# Patient Record
Sex: FEMALE | Race: WHITE | HISPANIC OR LATINO | Employment: FULL TIME | URBAN - METROPOLITAN AREA
[De-identification: names, ages, dates, MRNs, and addresses within clinical notes are randomized per-mention and may not be internally consistent; named-entity substitution may affect disease eponyms.]

---

## 2024-07-15 ENCOUNTER — TELEPHONE (OUTPATIENT)
Dept: OBSTETRICS AND GYNECOLOGY | Facility: CLINIC | Age: 29
End: 2024-07-15
Payer: COMMERCIAL

## 2024-07-24 ENCOUNTER — APPOINTMENT (OUTPATIENT)
Dept: ULTRASOUND IMAGING | Facility: HOSPITAL | Age: 29
End: 2024-07-24
Payer: COMMERCIAL

## 2024-07-24 ENCOUNTER — HOSPITAL ENCOUNTER (EMERGENCY)
Facility: HOSPITAL | Age: 29
Discharge: HOME | End: 2024-07-24
Attending: EMERGENCY MEDICINE
Payer: COMMERCIAL

## 2024-07-24 ENCOUNTER — TELEPHONE (OUTPATIENT)
Dept: OBSTETRICS AND GYNECOLOGY | Facility: CLINIC | Age: 29
End: 2024-07-24
Payer: COMMERCIAL

## 2024-07-24 VITALS
BODY MASS INDEX: 34.55 KG/M2 | DIASTOLIC BLOOD PRESSURE: 65 MMHG | SYSTOLIC BLOOD PRESSURE: 128 MMHG | OXYGEN SATURATION: 100 % | TEMPERATURE: 98 F | WEIGHT: 195 LBS | HEIGHT: 63 IN | RESPIRATION RATE: 14 BRPM | HEART RATE: 49 BPM

## 2024-07-24 DIAGNOSIS — O41.8X10 SUBCHORIONIC HEMATOMA IN FIRST TRIMESTER, SINGLE OR UNSPECIFIED FETUS: ICD-10-CM

## 2024-07-24 DIAGNOSIS — O46.8X1 SUBCHORIONIC HEMATOMA IN FIRST TRIMESTER, SINGLE OR UNSPECIFIED FETUS: ICD-10-CM

## 2024-07-24 DIAGNOSIS — O20.0 THREATENED ABORTION: ICD-10-CM

## 2024-07-24 DIAGNOSIS — Z3A.01 5 WEEKS GESTATION OF PREGNANCY: Primary | ICD-10-CM

## 2024-07-24 LAB
ABO GROUP BLD: NORMAL
ALBUMIN SERPL-MCNC: 4.1 G/DL (ref 3.5–5.2)
ALBUMIN/GLOB SERPL: 1.4 G/DL
ALP SERPL-CCNC: 53 U/L (ref 39–117)
ALT SERPL W P-5'-P-CCNC: 13 U/L (ref 1–33)
ANION GAP SERPL CALCULATED.3IONS-SCNC: 11 MMOL/L (ref 5–15)
AST SERPL-CCNC: 17 U/L (ref 1–32)
BACTERIA UR QL AUTO: ABNORMAL /HPF
BASOPHILS # BLD AUTO: 0.03 10*3/MM3 (ref 0–0.2)
BASOPHILS NFR BLD AUTO: 0.4 % (ref 0–1.5)
BILIRUB SERPL-MCNC: 0.3 MG/DL (ref 0–1.2)
BILIRUB UR QL STRIP: NEGATIVE
BUN SERPL-MCNC: 13 MG/DL (ref 6–20)
BUN/CREAT SERPL: 20.3 (ref 7–25)
CALCIUM SPEC-SCNC: 9.3 MG/DL (ref 8.6–10.5)
CHLORIDE SERPL-SCNC: 101 MMOL/L (ref 98–107)
CLARITY UR: ABNORMAL
CO2 SERPL-SCNC: 22 MMOL/L (ref 22–29)
COLOR UR: YELLOW
CREAT SERPL-MCNC: 0.64 MG/DL (ref 0.57–1)
DEPRECATED RDW RBC AUTO: 46.3 FL (ref 37–54)
EGFRCR SERPLBLD CKD-EPI 2021: 124.4 ML/MIN/1.73
EOSINOPHIL # BLD AUTO: 0.03 10*3/MM3 (ref 0–0.4)
EOSINOPHIL NFR BLD AUTO: 0.4 % (ref 0.3–6.2)
ERYTHROCYTE [DISTWIDTH] IN BLOOD BY AUTOMATED COUNT: 13.2 % (ref 12.3–15.4)
GLOBULIN UR ELPH-MCNC: 3 GM/DL
GLUCOSE SERPL-MCNC: 94 MG/DL (ref 65–99)
GLUCOSE UR STRIP-MCNC: NEGATIVE MG/DL
HCG INTACT+B SERPL-ACNC: NORMAL MIU/ML
HCT VFR BLD AUTO: 41.4 % (ref 34–46.6)
HGB BLD-MCNC: 13.7 G/DL (ref 12–15.9)
HGB UR QL STRIP.AUTO: ABNORMAL
HYALINE CASTS UR QL AUTO: ABNORMAL /LPF
IMM GRANULOCYTES # BLD AUTO: 0.02 10*3/MM3 (ref 0–0.05)
IMM GRANULOCYTES NFR BLD AUTO: 0.3 % (ref 0–0.5)
KETONES UR QL STRIP: ABNORMAL
LEUKOCYTE ESTERASE UR QL STRIP.AUTO: NEGATIVE
LYMPHOCYTES # BLD AUTO: 1.91 10*3/MM3 (ref 0.7–3.1)
LYMPHOCYTES NFR BLD AUTO: 28.1 % (ref 19.6–45.3)
MCH RBC QN AUTO: 31.4 PG (ref 26.6–33)
MCHC RBC AUTO-ENTMCNC: 33.1 G/DL (ref 31.5–35.7)
MCV RBC AUTO: 95 FL (ref 79–97)
MONOCYTES # BLD AUTO: 0.5 10*3/MM3 (ref 0.1–0.9)
MONOCYTES NFR BLD AUTO: 7.4 % (ref 5–12)
NEUTROPHILS NFR BLD AUTO: 4.31 10*3/MM3 (ref 1.7–7)
NEUTROPHILS NFR BLD AUTO: 63.4 % (ref 42.7–76)
NITRITE UR QL STRIP: NEGATIVE
NRBC BLD AUTO-RTO: 0 /100 WBC (ref 0–0.2)
NUMBER OF DOSES: NORMAL
PH UR STRIP.AUTO: 7 [PH] (ref 5–8)
PLATELET # BLD AUTO: 274 10*3/MM3 (ref 140–450)
PMV BLD AUTO: 10.3 FL (ref 6–12)
POTASSIUM SERPL-SCNC: 4.2 MMOL/L (ref 3.5–5.2)
PROT SERPL-MCNC: 7.1 G/DL (ref 6–8.5)
PROT UR QL STRIP: ABNORMAL
RBC # BLD AUTO: 4.36 10*6/MM3 (ref 3.77–5.28)
RBC # UR STRIP: ABNORMAL /HPF
REF LAB TEST METHOD: ABNORMAL
RH BLD: POSITIVE
SODIUM SERPL-SCNC: 134 MMOL/L (ref 136–145)
SP GR UR STRIP: 1.02 (ref 1–1.03)
SQUAMOUS #/AREA URNS HPF: ABNORMAL /HPF
UROBILINOGEN UR QL STRIP: ABNORMAL
WBC # UR STRIP: ABNORMAL /HPF
WBC NRBC COR # BLD AUTO: 6.8 10*3/MM3 (ref 3.4–10.8)

## 2024-07-24 PROCEDURE — 80053 COMPREHEN METABOLIC PANEL: CPT | Performed by: PHYSICIAN ASSISTANT

## 2024-07-24 PROCEDURE — 86901 BLOOD TYPING SEROLOGIC RH(D): CPT | Performed by: PHYSICIAN ASSISTANT

## 2024-07-24 PROCEDURE — 81001 URINALYSIS AUTO W/SCOPE: CPT | Performed by: PHYSICIAN ASSISTANT

## 2024-07-24 PROCEDURE — 86900 BLOOD TYPING SEROLOGIC ABO: CPT | Performed by: PHYSICIAN ASSISTANT

## 2024-07-24 PROCEDURE — 84702 CHORIONIC GONADOTROPIN TEST: CPT | Performed by: PHYSICIAN ASSISTANT

## 2024-07-24 PROCEDURE — 76817 TRANSVAGINAL US OBSTETRIC: CPT

## 2024-07-24 PROCEDURE — 93976 VASCULAR STUDY: CPT

## 2024-07-24 PROCEDURE — 85025 COMPLETE CBC W/AUTO DIFF WBC: CPT | Performed by: PHYSICIAN ASSISTANT

## 2024-07-24 PROCEDURE — 36415 COLL VENOUS BLD VENIPUNCTURE: CPT

## 2024-07-24 PROCEDURE — 99284 EMERGENCY DEPT VISIT MOD MDM: CPT

## 2024-07-24 NOTE — LETTER
July 24, 2024     Patient: Yasmeen Nieto   YOB: 1995   Date of Visit: 7/24/2024       To Whom It May Concern:    It is my medical opinion that Yasmeen Nieto may return to light duty immediately with the following restrictions: No heavy or repetitive lifting greater than 20 pounds for the next two weeks . This may be re-evaluated after that time.           Sincerely,        Vanessa Bowles MD    CC: No Recipients

## 2024-07-24 NOTE — ED PROVIDER NOTES
Subjective   History of Present Illness  28-year-old female presents emergency department today with some vaginal bleeding.  She states she thinks she is about 7 to 8 weeks pregnant.  She been having some bleeding yesterday since then clots about the size of a dime or so.  Not passed any tissue.  She is G1, P0 Ab0.  She has a appointment with her OB/GYN for the first time in August.  She had no dysuria frequency urgency or hematuria.  She has no other complaints.    History provided by:  Patient and relative   used: No    Vaginal Bleeding - Pregnant  Quality:  Bright red and clots  Severity:  Moderate  Onset quality:  Sudden  Duration:  1 day  Timing:  Intermittent  Progression:  Waxing and waning  Chronicity:  New  Prior pregnancy: no    Pregnancy confirmed by ultrasound: no    Gestational age:  7  Prenatal care:  No prenatal care  Number of pads used:  2  Context: not after bowel movement, not after intercourse, not at rest, not during bowel movement, not during intercourse, not during urination, not genital trauma and not spontaneously    Relieved by:  Nothing  Worsened by:  Nothing  Ineffective treatments:  None tried  Associated symptoms: no abdominal pain, no back pain, no dizziness, no dyspareunia, no dysuria, no fatigue, no fever, no nausea and no vaginal discharge    Risk factors: no bleeding disorder, no hx of ectopic pregnancy, no hx of endometriosis, does not have multiple partners, no ovarian cysts, no ovarian torsion, no PID, no prior miscarriage, no STD exposure, no terminated pregnancy and no unprotected sex        Review of Systems   Constitutional:  Negative for fatigue and fever.   Respiratory:  Negative for chest tightness, shortness of breath and wheezing.    Cardiovascular:  Negative for chest pain and palpitations.   Gastrointestinal:  Negative for abdominal pain and nausea.   Genitourinary:  Negative for dyspareunia, dysuria, flank pain, frequency, hematuria and vaginal  discharge.   Musculoskeletal:  Negative for back pain.   Neurological:  Negative for dizziness.   Hematological: Negative.    Psychiatric/Behavioral: Negative.         No past medical history on file.    No Known Allergies    No past surgical history on file.    No family history on file.    Social History     Socioeconomic History    Marital status: Single           Objective   Physical Exam  Vitals and nursing note reviewed.   Constitutional:       General: She is not in acute distress.     Appearance: She is well-developed. She is not diaphoretic.   HENT:      Head: Normocephalic and atraumatic.      Nose: Nose normal.   Eyes:      General: No scleral icterus.     Conjunctiva/sclera: Conjunctivae normal.   Cardiovascular:      Rate and Rhythm: Normal rate and regular rhythm.      Heart sounds: Normal heart sounds. No murmur heard.  Pulmonary:      Effort: Pulmonary effort is normal. No respiratory distress.      Breath sounds: Normal breath sounds.   Abdominal:      General: Bowel sounds are normal.      Palpations: Abdomen is soft.      Tenderness: There is no abdominal tenderness.   Musculoskeletal:         General: Normal range of motion.      Cervical back: Normal range of motion and neck supple.   Skin:     General: Skin is warm and dry.   Neurological:      Mental Status: She is alert and oriented to person, place, and time.   Psychiatric:         Behavior: Behavior normal.         Procedures           ED Course  ED Course as of 07/24/24 1053   Wed Jul 24, 2024   1051 Laboratory values white count of 6.8 with an H&H of 13 and 41 and a platelet count of 274 urinalysis was contaminated had TNTC RBCs 0-2 WBCs.  She is a positive blood type.  No RhoGAM needed.  Her quantitative hCG was 19,000.  CMP had glucose of 94 and sodium 94 BUN of 13 creatinine 0.64. [YAS]      ED Course User Index  [YAS] Bubba Garcia PA                                             Medical Decision Making  Problems Addressed:  5  weeks gestation of pregnancy: complicated acute illness or injury  Subchorionic hematoma in first trimester, single or unspecified fetus: complicated acute illness or injury  Threatened : complicated acute illness or injury    Amount and/or Complexity of Data Reviewed  Labs: ordered.  Radiology: ordered.        Final diagnoses:   5 weeks gestation of pregnancy   Subchorionic hematoma in first trimester, single or unspecified fetus   Threatened        ED Disposition  ED Disposition       ED Disposition   Discharge    Condition   Stable    Comment   --               Vanessa Bowles MD  [address] [address]  [phone]      Call for appointment         Medication List      No changes were made to your prescriptions during this visit.            Bubba Garcia PA  24 [id number]

## 2024-07-24 NOTE — Clinical Note
[facility] EMERGENCY DEPARTMENT  [address]  [facility] [address]  Phone: [phone]    Yasmeen Nieto was seen and treated in our emergency department on 7/24/2024.  She may return to work on 07/25/2024.  Pelvic rest, return for bleeding greater than 2 pads an hour for 2 or more consecutive hours.       Thank you for choosing [facility].    Bubba Garcia PA

## 2024-07-24 NOTE — TELEPHONE ENCOUNTER
Provider:  DR FALCON    Caller:MARIIA JEFERSON    Phone Number:[phone]     Reason for Call:PATIENT WAS SEEN AT THE ER TODAY FOR THREATEN MISCARRIAGE AND THEY ADVISED TO FOLLOW UP WITH HER OB AND THEY ER PUT HER ON PELVIC REST SHE ISNT SURE WHAT THAT MEANS SHE WORKS AT [employer] AND STANDS FOR LONG HOURS (10HR SHIFTS) AND HEAVY LIFTING//SHE IS SUPPOSED TO RETURN TOMORROW PER THE NOTE//PLEASE FOLLOW UP

## 2024-07-24 NOTE — TELEPHONE ENCOUNTER
Pt instructed on light duty/pelvic rest restrictions.   Return to work on light duty letter created and left at  for pt to .  Appt rescheduled to 7/30/24 with US to FU TAB.

## 2024-07-24 NOTE — TELEPHONE ENCOUNTER
PT HAD PAPERWORK FROM HER JOB FAXED TO OFFICE WHICH NEEDS TO BE FILLED OUT & FAXED BACK BY TOMORROW    ONCE IT DONE, PLEASE ATTACH A COPY TO HER MYCHART

## 2024-07-27 ENCOUNTER — HOSPITAL ENCOUNTER (EMERGENCY)
Facility: HOSPITAL | Age: 29
Discharge: HOME | End: 2024-07-27
Attending: EMERGENCY MEDICINE
Payer: COMMERCIAL

## 2024-07-27 ENCOUNTER — APPOINTMENT (OUTPATIENT)
Dept: ULTRASOUND IMAGING | Facility: HOSPITAL | Age: 29
End: 2024-07-27
Payer: COMMERCIAL

## 2024-07-27 VITALS
HEART RATE: 50 BPM | DIASTOLIC BLOOD PRESSURE: 75 MMHG | RESPIRATION RATE: 18 BRPM | BODY MASS INDEX: 34.55 KG/M2 | SYSTOLIC BLOOD PRESSURE: 123 MMHG | HEIGHT: 63 IN | TEMPERATURE: 98.8 F | WEIGHT: 195 LBS | OXYGEN SATURATION: 99 %

## 2024-07-27 DIAGNOSIS — O03.9 MISCARRIAGE: Primary | ICD-10-CM

## 2024-07-27 LAB
ABO GROUP BLD: NORMAL
BASOPHILS # BLD AUTO: 0.04 10*3/MM3 (ref 0–0.2)
BASOPHILS NFR BLD AUTO: 0.4 % (ref 0–1.5)
BLD GP AB SCN SERPL QL: NEGATIVE
DEPRECATED RDW RBC AUTO: 47 FL (ref 37–54)
EOSINOPHIL # BLD AUTO: 0.01 10*3/MM3 (ref 0–0.4)
EOSINOPHIL NFR BLD AUTO: 0.1 % (ref 0.3–6.2)
ERYTHROCYTE [DISTWIDTH] IN BLOOD BY AUTOMATED COUNT: 13.2 % (ref 12.3–15.4)
HCG INTACT+B SERPL-ACNC: NORMAL MIU/ML
HCT VFR BLD AUTO: 40.4 % (ref 34–46.6)
HGB BLD-MCNC: 13.4 G/DL (ref 12–15.9)
HOLD SPECIMEN: NORMAL
IMM GRANULOCYTES # BLD AUTO: 0.04 10*3/MM3 (ref 0–0.05)
IMM GRANULOCYTES NFR BLD AUTO: 0.4 % (ref 0–0.5)
LYMPHOCYTES # BLD AUTO: 1.86 10*3/MM3 (ref 0.7–3.1)
LYMPHOCYTES NFR BLD AUTO: 16.8 % (ref 19.6–45.3)
MCH RBC QN AUTO: 31.8 PG (ref 26.6–33)
MCHC RBC AUTO-ENTMCNC: 33.2 G/DL (ref 31.5–35.7)
MCV RBC AUTO: 96 FL (ref 79–97)
MONOCYTES # BLD AUTO: 0.42 10*3/MM3 (ref 0.1–0.9)
MONOCYTES NFR BLD AUTO: 3.8 % (ref 5–12)
NEUTROPHILS NFR BLD AUTO: 78.5 % (ref 42.7–76)
NEUTROPHILS NFR BLD AUTO: 8.7 10*3/MM3 (ref 1.7–7)
NRBC BLD AUTO-RTO: 0 /100 WBC (ref 0–0.2)
PLATELET # BLD AUTO: 274 10*3/MM3 (ref 140–450)
PMV BLD AUTO: 10.1 FL (ref 6–12)
RBC # BLD AUTO: 4.21 10*6/MM3 (ref 3.77–5.28)
RH BLD: POSITIVE
T&S EXPIRATION DATE: NORMAL
WBC NRBC COR # BLD AUTO: 11.07 10*3/MM3 (ref 3.4–10.8)
WHOLE BLOOD HOLD COAG: NORMAL

## 2024-07-27 PROCEDURE — 84702 CHORIONIC GONADOTROPIN TEST: CPT | Performed by: PHYSICIAN ASSISTANT

## 2024-07-27 PROCEDURE — 85025 COMPLETE CBC W/AUTO DIFF WBC: CPT | Performed by: PHYSICIAN ASSISTANT

## 2024-07-27 PROCEDURE — 86900 BLOOD TYPING SEROLOGIC ABO: CPT | Performed by: PHYSICIAN ASSISTANT

## 2024-07-27 PROCEDURE — 86901 BLOOD TYPING SEROLOGIC RH(D): CPT | Performed by: PHYSICIAN ASSISTANT

## 2024-07-27 PROCEDURE — 99284 EMERGENCY DEPT VISIT MOD MDM: CPT

## 2024-07-27 PROCEDURE — 86850 RBC ANTIBODY SCREEN: CPT | Performed by: PHYSICIAN ASSISTANT

## 2024-07-27 PROCEDURE — 25810000003 SODIUM CHLORIDE 0.9 % SOLUTION: Performed by: PHYSICIAN ASSISTANT

## 2024-07-27 PROCEDURE — 76817 TRANSVAGINAL US OBSTETRIC: CPT

## 2024-07-27 RX ORDER — SODIUM CHLORIDE 0.9 % (FLUSH) 0.9 %
10 SYRINGE (ML) INJECTION AS NEEDED
Status: DISCONTINUED | OUTPATIENT
Start: 2024-07-27 | End: 2024-07-27 | Stop reason: HOSPADM

## 2024-07-27 RX ADMIN — SODIUM CHLORIDE 1000 ML: 9 INJECTION, SOLUTION INTRAVENOUS at 16:24

## 2024-07-27 NOTE — ED PROVIDER NOTES
Subjective   History of Present Illness  38-year-old female presents emergency department today with vaginal bleeding.  She was seen about 3 days ago by myself was diagnosed with a threatened AB where she had a subchorionic hemorrhage.  At that time her quantitative hCG was about 19,000.  Her transvaginal ultrasound revealed about a 5-week 5-day intrauterine pregnancy.  She is had quite a bit of cramping initially passed a large clot the cramping is stopped and the bleeding is almost stopped as well.  She had no nausea or vomiting.  No other complaints.   Ab0.    History provided by:  Patient   used: No    Vaginal Bleeding - Pregnant  Quality:  Bright red and clots  Severity:  Moderate  Onset quality:  Sudden  Duration:  2 hours  Timing:  Constant  Progression:  Resolved  Chronicity:  New  Prior pregnancy: no    Pregnancy confirmed by ultrasound: yes    Gestational age:  5-week 5-day 3 days ago.  Number of pads used:  3  Context: spontaneously    Relieved by:  Nothing  Worsened by:  Nothing  Ineffective treatments:  None tried  Associated symptoms: abdominal pain    Associated symptoms: no dysuria, no fatigue, no nausea and no vaginal discharge    Risk factors: no bleeding disorder, no ovarian cysts, no ovarian torsion, no STD exposure and no unprotected sex        Review of Systems   Constitutional:  Negative for fatigue.   Respiratory:  Negative for choking, chest tightness, shortness of breath and wheezing.    Cardiovascular:  Negative for chest pain and palpitations.   Gastrointestinal:  Positive for abdominal pain. Negative for nausea.   Genitourinary:  Negative for dysuria and vaginal discharge.   Psychiatric/Behavioral: Negative.         History reviewed. No pertinent past medical history.    No Known Allergies    History reviewed. No pertinent surgical history.    History reviewed. No pertinent family history.    Social History     Socioeconomic History   • Marital status: Single            Objective   Physical Exam  Vitals and nursing note reviewed.   Constitutional:       General: She is not in acute distress.     Appearance: She is well-developed. She is not diaphoretic.      Comments: Warm pink dry afebrile nontoxic   HENT:      Head: Normocephalic and atraumatic.      Nose: Nose normal.   Eyes:      General: No scleral icterus.     Conjunctiva/sclera: Conjunctivae normal.   Cardiovascular:      Rate and Rhythm: Normal rate and regular rhythm.      Heart sounds: Normal heart sounds. No murmur heard.  Pulmonary:      Effort: Pulmonary effort is normal. No respiratory distress.      Breath sounds: Normal breath sounds.   Abdominal:      General: Bowel sounds are normal.      Palpations: Abdomen is soft.      Tenderness: There is no abdominal tenderness.   Musculoskeletal:         General: Normal range of motion.      Cervical back: Normal range of motion and neck supple.   Skin:     General: Skin is warm and dry.   Neurological:      Mental Status: She is alert and oriented to person, place, and time.   Psychiatric:         Behavior: Behavior normal.       Procedures           ED Course  ED Course as of 07/27/24 1857   Sat Jul 27, 2024   1800 Call paged on-call provider for Dr. Bowles should be Randa Hernandez.  I discussed the findings with Ms. Nieto. [YAS]   1835 Repaged apparently Dr. Casillas is on-call. [YAS]   1845 Spoke to Dr. Casillas.  He states that have the patient call Monday morning and they will see the patient for follow-up. [YAS]   1856 Laboratory data patient's white count 11.07 with an H&H of 13 and 40 she is a positive blood type her quantitative hCG was 17,003 days ago was 19,000.  Ultrasound revealed what appears to be a completed miscarriage.  I discussed this with Dr. Casillas and with Mrs. Nieto.  They will follow-up on Monday. [YAS]      ED Course User Index  [YAS] Bubba Garcia PA                                   Recent Results (from the past 24 hour(s))    Green Top (Gel)    Collection Time: 07/27/24  4:15 PM   Result Value Ref Range    Extra Tube Hold for add-ons.    Gold Top - SST    Collection Time: 07/27/24  4:15 PM   Result Value Ref Range    Extra Tube Hold for add-ons.    Gray Top    Collection Time: 07/27/24  4:15 PM   Result Value Ref Range    Extra Tube Hold for add-ons.    Light Blue Top    Collection Time: 07/27/24  4:15 PM   Result Value Ref Range    Extra Tube Hold for add-ons.    hCG, Quantitative, Pregnancy    Collection Time: 07/27/24  4:15 PM    Specimen: Blood   Result Value Ref Range    HCG Quantitative 17,256.00 mIU/mL   Type & Screen    Collection Time: 07/27/24  4:19 PM    Specimen: Blood   Result Value Ref Range    ABO Type A     RH type Positive     Antibody Screen Negative     T&S Expiration Date 7/30/2024 11:59:59 PM    CBC Auto Differential    Collection Time: 07/27/24  4:19 PM    Specimen: Blood   Result Value Ref Range    WBC 11.07 (H) 3.40 - 10.80 10*3/mm3    RBC 4.21 3.77 - 5.28 10*6/mm3    Hemoglobin 13.4 12.0 - 15.9 g/dL    Hematocrit 40.4 34.0 - 46.6 %    MCV 96.0 79.0 - 97.0 fL    MCH 31.8 26.6 - 33.0 pg    MCHC 33.2 31.5 - 35.7 g/dL    RDW 13.2 12.3 - 15.4 %    RDW-SD 47.0 37.0 - 54.0 fl    MPV 10.1 6.0 - 12.0 fL    Platelets 274 140 - 450 10*3/mm3    Neutrophil % 78.5 (H) 42.7 - 76.0 %    Lymphocyte % 16.8 (L) 19.6 - 45.3 %    Monocyte % 3.8 (L) 5.0 - 12.0 %    Eosinophil % 0.1 (L) 0.3 - 6.2 %    Basophil % 0.4 0.0 - 1.5 %    Immature Grans % 0.4 0.0 - 0.5 %    Neutrophils, Absolute 8.70 (H) 1.70 - 7.00 10*3/mm3    Lymphocytes, Absolute 1.86 0.70 - 3.10 10*3/mm3    Monocytes, Absolute 0.42 0.10 - 0.90 10*3/mm3    Eosinophils, Absolute 0.01 0.00 - 0.40 10*3/mm3    Basophils, Absolute 0.04 0.00 - 0.20 10*3/mm3    Immature Grans, Absolute 0.04 0.00 - 0.05 10*3/mm3    nRBC 0.0 0.0 - 0.2 /100 WBC     Note: In addition to lab results from this visit, the labs listed above may include labs taken at another facility or during a  "different encounter within the last 24 hours. Please correlate lab times with ED admission and discharge times for further clarification of the services performed during this visit.    US Ob Transvaginal   Final Result   Impression:   Previously seen intrauterine gestation/gestational sac is no longer identified. The cervix appears closed. Findings are compatible incomplete versus complete miscarriage. No obvious retained products of conception. Correlate with clinical history and    beta hCG.                   Electronically Signed: Roosevelt Orellana MD     7/27/2024 5:51 PM EDT     Workstation ID: [id number]        Vitals:    07/27/24 1541 07/27/24 1605 07/27/24 1630   BP: 140/57 123/75    BP Location: Right arm     Patient Position: Sitting     Pulse: 58 59 50   Resp: 18     Temp: 98.8 °F (37.1 °C)     TempSrc: Oral     SpO2: 98% 95% 99%   Weight: 88.5 kg (195 lb)     Height: 158.8 cm (62.5\")       Medications   sodium chloride 0.9 % flush 10 mL (has no administration in time range)   sodium chloride 0.9 % bolus 1,000 mL (0 mL Intravenous Stopped 7/27/24 1820)     ECG/EMG Results (last 24 hours)       ** No results found for the last 24 hours. **          No orders to display                 Medical Decision Making  Problems Addressed:  Miscarriage: complicated acute illness or injury    Amount and/or Complexity of Data Reviewed  Labs: ordered.  Radiology: ordered.    Risk  Prescription drug management.        Final diagnoses:   Miscarriage       ED Disposition  ED Disposition       ED Disposition   Discharge    Condition   Stable    Comment   --               Vanessa Bowles MD  [address] [id number]  [phone]      Call office on Monday morning         Medication List      No changes were made to your prescriptions during this visit.            Bubba Garcia PA  07/28/24 1013    "

## 2024-07-29 ENCOUNTER — TELEPHONE (OUTPATIENT)
Dept: OBSTETRICS AND GYNECOLOGY | Facility: CLINIC | Age: 29
End: 2024-07-29
Payer: COMMERCIAL

## 2024-07-29 NOTE — TELEPHONE ENCOUNTER
"Patient has not been seen in this office before. She was scheduled for tomorrow with ultrasound for F/U on TAB from ER with KUSH Lockwood and a NOB visit with Dr. Bowles 08/05/24.  Returned patient's call.   She was seen in ER 07/24/24 with TAB; GS measured 6w4d; fetal pole measured 5w5d and GERTRUDE noted by ultrasound.   States she went back to the ER 07/27/24 with heavy bleeding and passing a clot the size of her fist. States was told she had miscarried.   Ultrasound report Impression:  \"Previously seen intrauterine gestation/gestational sac is no longer identified. The cervix appears closed. Findings are compatible incomplete versus complete miscarriage. No obvious retained products of conception. Correlate with clinical history and beta hCG.\"     Asking when she needs to follow up.   States her bleeding is minimal and she denies any pain.   Discussed with KUSH Anderson.   She recommends patient be seen sometime this week for exam and HCG.   Informed patient. Discussed need to follow HCG level. She v/u and agreed. Appointment scheduled for 07/31/24; other appointments canceled. .     "

## 2024-07-29 NOTE — TELEPHONE ENCOUNTER
Patient calling back to follow up from ED visit had MAB over the weekend states she is still bleeding a little bit please advise

## 2024-07-31 ENCOUNTER — OFFICE VISIT (OUTPATIENT)
Dept: OBSTETRICS AND GYNECOLOGY | Facility: CLINIC | Age: 29
End: 2024-07-31
Payer: COMMERCIAL

## 2024-07-31 VITALS
HEIGHT: 63 IN | BODY MASS INDEX: 33.81 KG/M2 | SYSTOLIC BLOOD PRESSURE: 124 MMHG | DIASTOLIC BLOOD PRESSURE: 80 MMHG | WEIGHT: 190.8 LBS

## 2024-07-31 DIAGNOSIS — O02.1 MISSED ABORTION: Primary | ICD-10-CM

## 2024-07-31 LAB
B-HCG UR QL: POSITIVE
BILIRUB BLD-MCNC: ABNORMAL MG/DL
CLARITY, POC: ABNORMAL
COLOR UR: ABNORMAL
EXPIRATION DATE: ABNORMAL
GLUCOSE UR STRIP-MCNC: NEGATIVE MG/DL
INTERNAL NEGATIVE CONTROL: ABNORMAL
INTERNAL POSITIVE CONTROL: ABNORMAL
KETONES UR QL: NEGATIVE
LEUKOCYTE EST, POC: NEGATIVE
Lab: ABNORMAL
NITRITE UR-MCNC: NEGATIVE MG/ML
PH UR: 6 [PH] (ref 5–8)
PROT UR STRIP-MCNC: ABNORMAL MG/DL
RBC # UR STRIP: ABNORMAL /UL
SP GR UR: 1.02 (ref 1–1.03)
UROBILINOGEN UR QL: ABNORMAL

## 2024-07-31 RX ORDER — LANOLIN ALCOHOL/MO/W.PET/CERES
3 CREAM (GRAM) TOPICAL DAILY
COMMUNITY

## 2024-07-31 RX ORDER — OMEPRAZOLE 20 MG/1
20 CAPSULE, DELAYED RELEASE ORAL DAILY
COMMUNITY

## 2024-07-31 NOTE — PROGRESS NOTES
"    Chief Complaint   Patient presents with    Follow-up     Seen in ER 24 and 24    MAB    Abdominal Pain     lower    Vaginal Bleeding            HPI  Yasmeen Nieto is a 28 y.o. female,new patient, , who presents for follow up on on ER visit for Missed AB. Since then she has passage of tissue. Her bleeding today is spotting. She complains of dull low abdominal pain and breast tenderness. Her past medical history is non-contributory. She reports no additional symptoms or complaints.    Recent Tests:  US today: no  Rh Status: Positive      The additional following portions of the patient's history were reviewed and updated as appropriate: allergies, current medications, past family history, past medical history, past social history, past surgical history, and problem list.    Review of Systems   Constitutional: Negative.    HENT: Negative.     Eyes: Negative.    Respiratory: Negative.     Cardiovascular: Negative.    Gastrointestinal: Negative.    Endocrine: Negative.    Genitourinary: Negative.    Musculoskeletal: Negative.    Skin: Negative.    Allergic/Immunologic: Negative.    Neurological: Negative.    Hematological: Negative.    Psychiatric/Behavioral: Negative.           I have reviewed and agree with the HPI, ROS, and historical information as entered above.       Objective   /80   Ht 158.8 cm (62.5\")   Wt 86.5 kg (190 lb 12.8 oz)   LMP 2024 (Approximate)   BMI 34.34 kg/m²     Physical Exam  Physical Exam:  General:  well developed; well nourished  no acute distress  obese - Body mass index is 34.34 kg/m².   Abdomen: soft, non-tender; no masses  no umbilical or inguinal hernias are present   Pelvis: Not performed.         Assessment and Plan    Problem List Items Addressed This Visit       Missed  - Primary    Relevant Orders    POC Urinalysis Dipstick (Completed)    POC Pregnancy, Urine (Completed)    HCG, B-subunit, Quantitative    HCG, B-subunit, Quantitative "       Incomplete Ab  Pelvic Rest.  No douching, intercourse or use of tampons.  Call for an increase in bleeding, abdominal pain, or fever.  Need to follow HCGs weekly.  PNV's  Declines contraception.  Return for Annual physical.      Vanessa Bowles MD  07/31/2024

## 2024-08-01 LAB — HCG INTACT+B SERPL-ACNC: NORMAL MIU/ML

## 2024-09-09 ENCOUNTER — TELEPHONE (OUTPATIENT)
Dept: OBSTETRICS AND GYNECOLOGY | Facility: CLINIC | Age: 29
End: 2024-09-09
Payer: COMMERCIAL

## 2024-09-09 NOTE — TELEPHONE ENCOUNTER
Hub staff attempted to follow warm transfer process and was unsuccessful     Caller: Yasmeen Nieto    Relationship to patient: Self    Best call back number: [phone]    Patient is needing: PT HAS BEEN BLEEDING SINCE HER MISCARRIAGE IN JULY, WOULD LIKE TO SCHEDULE AN APPT.

## 2024-09-10 DIAGNOSIS — N93.9 ABNORMAL UTERINE BLEEDING (AUB): Primary | ICD-10-CM

## 2024-09-11 ENCOUNTER — OFFICE VISIT (OUTPATIENT)
Dept: OBSTETRICS AND GYNECOLOGY | Facility: CLINIC | Age: 29
End: 2024-09-11
Payer: COMMERCIAL

## 2024-09-11 VITALS
BODY MASS INDEX: 33.84 KG/M2 | HEIGHT: 63 IN | WEIGHT: 191 LBS | SYSTOLIC BLOOD PRESSURE: 112 MMHG | DIASTOLIC BLOOD PRESSURE: 72 MMHG

## 2024-09-11 DIAGNOSIS — O03.4 INCOMPLETE ABORTION: ICD-10-CM

## 2024-09-11 DIAGNOSIS — N93.9 ABNORMAL UTERINE BLEEDING (AUB): Primary | ICD-10-CM

## 2024-09-11 LAB
B-HCG UR QL: NEGATIVE
BILIRUB BLD-MCNC: NEGATIVE MG/DL
CLARITY, POC: CLEAR
COLOR UR: YELLOW
EXPIRATION DATE: NORMAL
GLUCOSE UR STRIP-MCNC: NEGATIVE MG/DL
INTERNAL NEGATIVE CONTROL: NORMAL
INTERNAL POSITIVE CONTROL: NORMAL
KETONES UR QL: NEGATIVE
LEUKOCYTE EST, POC: NEGATIVE
Lab: NORMAL
NITRITE UR-MCNC: NEGATIVE MG/ML
PH UR: 6.5 [PH] (ref 5–8)
PROT UR STRIP-MCNC: NEGATIVE MG/DL
RBC # UR STRIP: ABNORMAL /UL
SP GR UR: 1.01 (ref 1–1.03)
UROBILINOGEN UR QL: ABNORMAL

## 2024-09-11 RX ORDER — PROGESTERONE 200 MG/1
200 CAPSULE ORAL DAILY
Qty: 20 CAPSULE | Refills: 0 | Status: SHIPPED | OUTPATIENT
Start: 2024-09-11 | End: 2024-09-11

## 2024-09-11 RX ORDER — MISOPROSTOL 200 UG/1
TABLET ORAL
Qty: 4 TABLET | Refills: 0 | Status: SHIPPED | OUTPATIENT
Start: 2024-09-11

## 2024-09-12 LAB — HCG INTACT+B SERPL-ACNC: 1.62 MIU/ML

## 2024-09-18 ENCOUNTER — OFFICE VISIT (OUTPATIENT)
Dept: OBSTETRICS AND GYNECOLOGY | Facility: CLINIC | Age: 29
End: 2024-09-18
Payer: COMMERCIAL

## 2024-09-18 VITALS
BODY MASS INDEX: 34.73 KG/M2 | WEIGHT: 196 LBS | HEIGHT: 63 IN | SYSTOLIC BLOOD PRESSURE: 128 MMHG | DIASTOLIC BLOOD PRESSURE: 74 MMHG

## 2024-09-18 DIAGNOSIS — O03.4 INCOMPLETE ABORTION: Primary | ICD-10-CM

## 2024-09-18 DIAGNOSIS — O03.4 RETAINED PRODUCTS OF CONCEPTION AFTER MISCARRIAGE: ICD-10-CM

## 2024-09-25 ENCOUNTER — TELEPHONE (OUTPATIENT)
Dept: OBSTETRICS AND GYNECOLOGY | Facility: CLINIC | Age: 29
End: 2024-09-25
Payer: COMMERCIAL

## 2024-09-26 ENCOUNTER — OUTSIDE FACILITY SERVICE (OUTPATIENT)
Dept: OBSTETRICS AND GYNECOLOGY | Facility: CLINIC | Age: 29
End: 2024-09-26
Payer: COMMERCIAL

## 2024-09-26 ENCOUNTER — LAB REQUISITION (OUTPATIENT)
Dept: LAB | Facility: HOSPITAL | Age: 29
End: 2024-09-26
Payer: COMMERCIAL

## 2024-09-26 DIAGNOSIS — O03.4 INCOMPLETE SPONTANEOUS ABORTION WITHOUT COMPLICATION: ICD-10-CM

## 2024-09-26 PROCEDURE — 88305 TISSUE EXAM BY PATHOLOGIST: CPT | Performed by: OBSTETRICS & GYNECOLOGY

## 2024-09-26 RX ORDER — IBUPROFEN 600 MG/1
600 TABLET, FILM COATED ORAL EVERY 6 HOURS PRN
Qty: 30 TABLET | Refills: 0 | Status: SHIPPED | OUTPATIENT
Start: 2024-09-26

## 2024-09-26 RX ORDER — DOCUSATE SODIUM 100 MG/1
100 CAPSULE, LIQUID FILLED ORAL 2 TIMES DAILY
Qty: 60 CAPSULE | Refills: 1 | Status: SHIPPED | OUTPATIENT
Start: 2024-09-26

## 2024-09-26 RX ORDER — HYDROCODONE BITARTRATE AND ACETAMINOPHEN 5; 325 MG/1; MG/1
1-2 TABLET ORAL EVERY 6 HOURS PRN
Qty: 5 TABLET | Refills: 0 | Status: SHIPPED | OUTPATIENT
Start: 2024-09-26

## 2024-09-27 LAB
CYTO UR: NORMAL
LAB AP CASE REPORT: NORMAL
LAB AP CLINICAL INFORMATION: NORMAL
PATH REPORT.FINAL DX SPEC: NORMAL
PATH REPORT.GROSS SPEC: NORMAL

## 2024-09-30 ENCOUNTER — TELEPHONE (OUTPATIENT)
Dept: OBSTETRICS AND GYNECOLOGY | Facility: CLINIC | Age: 29
End: 2024-09-30
Payer: COMMERCIAL

## 2024-10-16 ENCOUNTER — OFFICE VISIT (OUTPATIENT)
Dept: OBSTETRICS AND GYNECOLOGY | Facility: CLINIC | Age: 29
End: 2024-10-16
Payer: COMMERCIAL

## 2024-10-16 VITALS
BODY MASS INDEX: 33.42 KG/M2 | WEIGHT: 188.6 LBS | DIASTOLIC BLOOD PRESSURE: 58 MMHG | SYSTOLIC BLOOD PRESSURE: 96 MMHG | HEIGHT: 63 IN

## 2024-10-16 DIAGNOSIS — L65.9 ALOPECIA: Primary | ICD-10-CM

## 2024-10-16 DIAGNOSIS — Z48.89 POSTOPERATIVE VISIT: ICD-10-CM

## 2024-10-16 PROCEDURE — 99024 POSTOP FOLLOW-UP VISIT: CPT | Performed by: OBSTETRICS & GYNECOLOGY

## 2024-10-16 NOTE — PROGRESS NOTES
OBGYN Postoperative Exam Note          Subjective   Chief Complaint   Patient presents with    Postop Exam     2 wks     Yasmeen Nieto is a 28 y.o. year old  presenting to be seen for her post-operative visit. She is S/P Hystersocopy/bx endometrium/polpectomy/d&C on 24 at [facility] for  AUB . Currently she reports no problems with eating, bowel movements, voiding, or wound drainage and pain is well controlled.    The pathology results from her procedure are in Yasmeen's record and are benign.      OTHER THINGS SHE WANTS TO DISCUSS TODAY:  Nothing else      Current Outpatient Medications:     Calcium 500-2.5 MG-MCG chewable tablet, Chew 3 tablets Daily., Disp: , Rfl:     docusate sodium (Colace) 100 MG capsule, Take 1 capsule by mouth 2 (Two) Times a Day., Disp: 60 capsule, Rfl: 1    flecainide (TAMBOCOR) 150 MG tablet, Take 0.5 tablets by mouth 2 (Two) Times a Day., Disp: , Rfl:     ibuprofen (ADVIL,MOTRIN) 600 MG tablet, Take 1 tablet by mouth Every 6 (Six) Hours As Needed for Moderate Pain., Disp: 30 tablet, Rfl: 0    metoprolol tartrate (LOPRESSOR) 25 MG tablet, Take 1 tablet by mouth 2 (Two) Times a Day., Disp: , Rfl:     Multiple Vitamins-Minerals (BARIATRIC MULTIVITAMINS/IRON PO), Take  by mouth., Disp: , Rfl:     omeprazole (priLOSEC) 20 MG capsule, Take 1 capsule by mouth Daily., Disp: , Rfl:     Prenatal Multivit-Min-Fe-FA (PRE-VA PO), Take  by mouth., Disp: , Rfl:     HYDROcodone-acetaminophen (NORCO) 5-325 MG per tablet, Take 1-2 tablet(s) by mouth every 6 hours As Needed for Moderate or Severe Pain (1 tablet if moderate or 2 tablets if severe). (Patient not taking: Reported on 10/16/2024), Disp: 5 tablet, Rfl: 0    miSOPROStol (Cytotec) 200 MCG tablet, Place 4 tabs PV at once and lie down to absorb. (Patient not taking: Reported on 10/16/2024), Disp: 4 tablet, Rfl: 0     Past Medical History:   Diagnosis Date    GERD (gastroesophageal reflux disease)     History of obstructive  "sleep apnea     Hypertension     Paroxysmal atrial fibrillation 07/2022        Past Surgical History:   Procedure Laterality Date    GASTRIC SLEEVE LAPAROSCOPIC  01/29/2024    MYRINGOTOMY W/ TUBES      WISDOM TOOTH EXTRACTION         The following portions of the patient's history were reviewed and updated as appropriate:current medications and allergies    Review of Systems   Constitutional: Negative.    HENT: Negative.     Eyes: Negative.    Respiratory: Negative.     Cardiovascular: Negative.    Gastrointestinal: Negative.    Endocrine: Negative.    Genitourinary: Negative.    Musculoskeletal: Negative.    Skin: Negative.    Allergic/Immunologic: Negative.    Neurological: Negative.    Hematological: Negative.    Psychiatric/Behavioral: Negative.            Objective   BP 96/58   Ht 158.8 cm (62.5\")   Wt 85.5 kg (188 lb 9.6 oz)   LMP 08/29/2024 (Exact Date)   BMI 33.95 kg/m²     Physical Exam2  Physical Exam:  General:  well developed; well nourished  no acute distress  mentation appropriate  obese - Body mass index is 33.95 kg/m².   Abdomen: soft, non-tender; no masses  no umbilical or inguinal hernias are present   Pelvis: Not performed.           Assessment   S/P hysteroscopy, D & C     Plan   May return to full activity with no restrictions  Pathology was reviewed with patient, Operative photos reviewed, and Any significant events that occurred during surgery reviewed  The importance of keeping all planned follow-up and taking all medications as prescribed was emphasized.  Return if symptoms worsen or fail to improve.              Vanessa Bowles MD  10/16/2024     "

## 2024-10-17 LAB
T4 FREE SERPL-MCNC: 1.12 NG/DL (ref 0.92–1.68)
TSH SERPL DL<=0.005 MIU/L-ACNC: 1.04 UIU/ML (ref 0.27–4.2)

## 2024-10-27 PROBLEM — L65.9 ALOPECIA: Status: ACTIVE | Noted: 2024-10-27

## 2024-10-27 PROBLEM — Z48.89 POSTOPERATIVE VISIT: Status: ACTIVE | Noted: 2024-10-27

## 2025-03-12 ENCOUNTER — OFFICE VISIT (OUTPATIENT)
Dept: OBSTETRICS AND GYNECOLOGY | Facility: CLINIC | Age: 30
End: 2025-03-12
Payer: COMMERCIAL

## 2025-03-12 VITALS — HEIGHT: 63 IN | BODY MASS INDEX: 33.92 KG/M2 | DIASTOLIC BLOOD PRESSURE: 74 MMHG | SYSTOLIC BLOOD PRESSURE: 120 MMHG

## 2025-03-12 DIAGNOSIS — Z20.2 POSSIBLE EXPOSURE TO SEXUALLY TRANSMITTED INFECTION: Primary | ICD-10-CM

## 2025-03-12 NOTE — PROGRESS NOTES
Chief Complaint   Patient presents with    Follow-up         Subjective   HPI  Yasmeen Nieto is a 29 y.o. female, , who presents for screening for STD's.     Her last LMP was 2025. She reports sexual contact with individual with uncertain background 1 month ago. Found out partner was cheating.  No condoms are used. The patient reports  vaginal bump on inside of her labia major that has now resolved . Denies pain. She report no symptoms. She is requesting STD testing with blood work. The patient denies history of sexually transmitted disease..      Additional OB/GYN History   Last Pap : 2 years : Negative HPV: Unknown  Last Completed Pap Smear    This patient has no relevant Health Maintenance data.       History of abnormal Pap smear: no  OB History          1    Para        Term                AB   1    Living   0         SAB        IAB        Ectopic        Molar        Multiple        Live Births                      Current Outpatient Medications:     Calcium 500-2.5 MG-MCG chewable tablet, Chew 3 tablets Daily., Disp: , Rfl:     flecainide (TAMBOCOR) 150 MG tablet, Take 0.5 tablets by mouth 2 (Two) Times a Day., Disp: , Rfl:     ibuprofen (ADVIL,MOTRIN) 600 MG tablet, Take 1 tablet by mouth Every 6 (Six) Hours As Needed for Moderate Pain., Disp: 30 tablet, Rfl: 0    metoprolol tartrate (LOPRESSOR) 25 MG tablet, Take 1 tablet by mouth 2 (Two) Times a Day., Disp: , Rfl:     Multiple Vitamins-Minerals (BARIATRIC MULTIVITAMINS/IRON PO), Take  by mouth., Disp: , Rfl:     omeprazole (priLOSEC) 20 MG capsule, Take 1 capsule by mouth Daily., Disp: , Rfl:     Prenatal Multivit-Min-Fe-FA (PRE-VA PO), Take  by mouth., Disp: , Rfl:      Past Medical History:   Diagnosis Date    GERD (gastroesophageal reflux disease)     History of obstructive sleep apnea     Hypertension     Paroxysmal atrial fibrillation 2022        Past Surgical History:   Procedure Laterality Date     "GASTRIC SLEEVE LAPAROSCOPIC  01/29/2024    MYRINGOTOMY W/ TUBES      WISDOM TOOTH EXTRACTION         The additional following portions of the patient's history were reviewed and updated as appropriate: allergies and current medications.    Review of Systems   Respiratory: Negative.  Negative for shortness of breath.    Cardiovascular: Negative.  Negative for chest pain.   Gastrointestinal: Negative.  Negative for abdominal distention, abdominal pain and constipation.   Genitourinary:  Negative for dyspareunia, dysuria, menstrual problem, pelvic pain, pelvic pressure, urinary incontinence, vaginal bleeding, vaginal discharge and vaginal pain.     All other systems reviewed and are negative.     I have reviewed and agree with the HPI, ROS, and historical information as entered above. Debra Moore, APRN      Objective   /74 (BP Location: Left arm, Patient Position: Lying, Cuff Size: Adult)   Ht 158.8 cm (62.52\")   BMI 33.92 kg/m²     Physical Exam  Vitals and nursing note reviewed. Exam conducted with a chaperone present.   Constitutional:       General: She is not in acute distress.     Appearance: Normal appearance. She is not ill-appearing or toxic-appearing.   HENT:      Head: Normocephalic and atraumatic.   Pulmonary:      Effort: Pulmonary effort is normal.   Abdominal:      Palpations: Abdomen is soft. There is no mass.      Tenderness: There is no abdominal tenderness.      Hernia: No hernia is present.   Genitourinary:     Labia:         Right: No rash, tenderness, lesion or injury.         Left: Lesion present. No rash, tenderness or injury.       Vagina: No signs of injury. Vaginal discharge present. No erythema, tenderness, bleeding or lesions.      Cervix: Discharge present. No cervical motion tenderness, friability, lesion, erythema, cervical bleeding or eversion.      Uterus: Normal. Not enlarged and not tender.       Adnexa: Right adnexa normal and left adnexa normal.        Right: No mass " or tenderness.          Left: No mass or tenderness.            Comments: Clear mucoid d/c noted.   Neurological:      Mental Status: She is alert and oriented to person, place, and time.   Psychiatric:         Mood and Affect: Mood normal.         Behavior: Behavior normal.         Assessment & Plan     Assessment and Plan    Problem List Items Addressed This Visit    None  Visit Diagnoses         Possible exposure to sexually transmitted infection    -  Primary    Relevant Orders    RPR, Rfx Qn RPR / Confirm TP    Hepatitis B Surface Antigen    Hepatitis C Antibody    HIV-1 / O / 2 Ag / Antibody 4th Generation    Chlamydia trachomatis, Neisseria gonorrhoeae, Trichomonas vaginalis, PCR - Swab, Cervix          Possible STI exposure    Plan:   Discussed safe sexual practice in detail. Recommended condom use to prevent STIs.   Nuswab and STD blood panel pending.  Discussed HSV issues in detail. Patient instructed to call for same day appt if genital lesion presents for testing and accurate diagnosis.   Will treat infection, if indicated.   Preventing STI education included in patient instructions.   Return in 1 month (on 4/12/2025) for Annual physical or sooner if needed, Pap.        Debra Moore, KUSH  03/12/2025

## 2025-03-13 LAB
C TRACH RRNA SPEC QL NAA+PROBE: NEGATIVE
HBV SURFACE AG SERPL QL IA: NEGATIVE
HCV IGG SERPL QL IA: NON REACTIVE
HIV 1+2 AB+HIV1 P24 AG SERPL QL IA: NON REACTIVE
N GONORRHOEA RRNA SPEC QL NAA+PROBE: NEGATIVE
RPR SER QL: NON REACTIVE
T VAGINALIS RRNA SPEC QL NAA+PROBE: NEGATIVE

## 2025-04-23 ENCOUNTER — OFFICE VISIT (OUTPATIENT)
Dept: OBSTETRICS AND GYNECOLOGY | Facility: CLINIC | Age: 30
End: 2025-04-23
Payer: COMMERCIAL

## 2025-04-23 VITALS
HEIGHT: 63 IN | DIASTOLIC BLOOD PRESSURE: 60 MMHG | SYSTOLIC BLOOD PRESSURE: 122 MMHG | WEIGHT: 182.6 LBS | BODY MASS INDEX: 32.36 KG/M2

## 2025-04-23 DIAGNOSIS — Z01.419 WOMEN'S ANNUAL ROUTINE GYNECOLOGICAL EXAMINATION: Primary | ICD-10-CM

## 2025-04-23 DIAGNOSIS — Z30.09 ENCOUNTER FOR COUNSELING REGARDING CONTRACEPTION: ICD-10-CM

## 2025-04-23 DIAGNOSIS — Z11.3 SCREEN FOR STD (SEXUALLY TRANSMITTED DISEASE): ICD-10-CM

## 2025-04-23 DIAGNOSIS — Z12.4 SCREENING FOR CERVICAL CANCER: ICD-10-CM

## 2025-04-23 NOTE — PROGRESS NOTES
Gynecologic Annual Exam Note        Gynecologic Exam        Subjective     HPI  Yasmeen Nieto is a 29 y.o.  female who presents for annual well woman exam as a established patient. There were no changes to her medical or surgical history since her last visit.. Patient's last menstrual period was 2025 (exact date). Her periods occur every 28 days, lasting 3-4 days.  The flow is moderate. She reports dysmenorrhea is mild occurring premenstrually and first 1-2 days of flow. Marital Status: single.  She is sexually active. She has not had new partners.. STD testing recommendations have been explained to the patient and she does not desire STD testing.    The patient would like to discuss the following complaints today: unprotected intercourse a couple of days ago due condom breaking. She states that she took a Plan B the beginning of last month. She is considering contraception, but is unsure if she would like to start because she plans to TTC soon. She also has a history of Afib and is unsure what options are recommended.     Additional OB/GYN History   contraceptive methods: Condoms  Desires to: continue contraception  Thromboembolic Disease: none  History of migraines: no  Age of menarche: 13    History of STD: no    Last Pap : 2 years ago in [place]. Results: negative. HPV: unknown .   Last Completed Pap Smear    This patient has no relevant Health Maintenance data.          History of abnormal Pap smear: no  Gardasil status:completed  Family history of uterine, colon, breast, or ovarian cancer: no  Performs monthly Self-Breast Exam: yes  Exercises Regularly:yes  Feelings of Anxiety or Depression: no  Tobacco Usage?: No       Current Outpatient Medications:     Calcium 500-2.5 MG-MCG chewable tablet, Chew 3 tablets Daily., Disp: , Rfl:     flecainide (TAMBOCOR) 150 MG tablet, Take 0.5 tablets by mouth 2 (Two) Times a Day., Disp: , Rfl:     metoprolol tartrate (LOPRESSOR) 25 MG tablet, Take  1 tablet by mouth 2 (Two) Times a Day., Disp: , Rfl:     Multiple Vitamins-Minerals (BARIATRIC MULTIVITAMINS/IRON PO), Take  by mouth., Disp: , Rfl:     omeprazole (priLOSEC) 20 MG capsule, Take 1 capsule by mouth Daily., Disp: , Rfl:     Prenatal Multivit-Min-Fe-FA (PRE- PO), Take  by mouth., Disp: , Rfl:      Patient denies the need for medication refills today.    OB History          1    Para        Term                AB   1    Living   0         SAB        IAB        Ectopic        Molar        Multiple        Live Births                    Health Maintenance   Topic Date Due    Annual Gynecologic Pelvic and Breast Exam  Never done    TDAP/TD VACCINES (1 - Tdap) Never done    PAP SMEAR  Never done    ANNUAL PHYSICAL  Never done    COVID-19 Vaccine (2024- season) Never done    INFLUENZA VACCINE  2025    HEPATITIS C SCREENING  Completed    Pneumococcal Vaccine 0-49  Aged Out       Past Medical History:   Diagnosis Date    Abnormal ECG     Coronary artery disease     GERD (gastroesophageal reflux disease)     History of obstructive sleep apnea     Hypertension     Paroxysmal atrial fibrillation 2022        Past Surgical History:   Procedure Laterality Date    GASTRIC SLEEVE LAPAROSCOPIC  2024    MYRINGOTOMY W/ TUBES      WISDOM TOOTH EXTRACTION         The additional following portions of the patient's history were reviewed and updated as appropriate: allergies, current medications, past family history, past medical history, past social history, and past surgical history.    Review of Systems   Respiratory: Negative.  Negative for shortness of breath.    Cardiovascular: Negative.  Negative for chest pain.   Gastrointestinal:  Negative for abdominal distention, abdominal pain and constipation.   Genitourinary: Negative.  Negative for breast discharge, breast lump, breast pain, dysuria, menstrual problem, pelvic pain, pelvic pressure, urinary incontinence, vaginal bleeding,  "vaginal discharge and vaginal pain.   Psychiatric/Behavioral: Negative.  Negative for depressed mood. The patient is not nervous/anxious.          I have reviewed and agree with the HPI, ROS, and historical information as entered above. Debra Moore, APRN          Objective   /60 (BP Location: Right arm, Patient Position: Sitting, Cuff Size: Adult)   Ht 158.8 cm (62.52\")   Wt 82.8 kg (182 lb 9.6 oz)   LMP 03/28/2025 (Exact Date)   BMI 32.84 kg/m²     Physical Exam  Vitals and nursing note reviewed. Exam conducted with a chaperone present.   Constitutional:       General: She is not in acute distress.     Appearance: Normal appearance. She is well-developed. She is not ill-appearing or toxic-appearing.   HENT:      Head: Normocephalic and atraumatic.   Pulmonary:      Effort: Pulmonary effort is normal. No retractions.   Chest:      Chest wall: No mass.   Breasts:     Breasts are symmetrical.      Right: Normal. No mass, nipple discharge, skin change or tenderness.      Left: Normal. No mass, nipple discharge, skin change or tenderness.   Abdominal:      Palpations: Abdomen is soft. Abdomen is not rigid. There is no mass.      Tenderness: There is no abdominal tenderness. There is no guarding or rebound.      Hernia: No hernia is present. There is no hernia in the left inguinal area or right inguinal area.   Genitourinary:     General: Normal vulva.      Labia:         Right: No rash, tenderness or lesion.         Left: No rash, tenderness or lesion.       Vagina: Normal. No vaginal discharge, erythema, tenderness, bleeding or lesions.      Cervix: Discharge present. No cervical motion tenderness, friability, lesion, erythema or cervical bleeding.      Uterus: Normal. Not enlarged, not fixed and not tender.       Adnexa: Right adnexa normal and left adnexa normal.        Right: No mass or tenderness.          Left: No mass or tenderness.        Rectum: No external hemorrhoid.      Comments: Watery clear " d/c noted.  Lymphadenopathy:      Upper Body:      Right upper body: No supraclavicular or axillary adenopathy.      Left upper body: No supraclavicular or axillary adenopathy.   Neurological:      Mental Status: She is alert and oriented to person, place, and time.   Psychiatric:         Mood and Affect: Mood normal.         Behavior: Behavior normal.            Assessment and Plan    Problem List Items Addressed This Visit    None  Visit Diagnoses         Women's annual routine gynecological examination    -  Primary    Relevant Orders    LIQUID-BASED PAP SMEAR WITH HPV GENOTYPING REGARDLESS OF INTERPRETATION ([facility])      Screen for STD (sexually transmitted disease)        Relevant Orders    LIQUID-BASED PAP SMEAR WITH HPV GENOTYPING REGARDLESS OF INTERPRETATION ([facility])      Screening for cervical cancer        Relevant Orders    LIQUID-BASED PAP SMEAR WITH HPV GENOTYPING REGARDLESS OF INTERPRETATION ([facility])      Encounter for counseling regarding contraception                GYN annual well woman exam.   Reviewed pap guidelines. Pap with STI testing pending.   Encouraged use of condoms for STD prevention.  Contraception options reviewed. Recommend progesterone only or non hormonal options due to history of Afib. She plans to discuss with Cardiologist to ensure progesterone only option is safe. She will call if she desires to begin contraception.   Reviewed monthly self breast exams.  Instructed to call with lumps, pain, or breast discharge.    Reviewed exercise as a preventative health measures.   Reccommended Flu Vaccine in Fall of each year.  Preventative care and contraception option education included in patient instructions.   Return in about 1 year (around 4/23/2026) for Annual physical or sooner if needed.    Debra Moore, APRN  04/23/2025

## 2025-04-28 PROBLEM — Z20.2 HPV EXPOSURE: Status: ACTIVE | Noted: 2025-04-28

## 2025-04-28 LAB — REF LAB TEST METHOD: NORMAL
